# Patient Record
Sex: MALE | Race: WHITE | NOT HISPANIC OR LATINO | Employment: UNEMPLOYED | URBAN - METROPOLITAN AREA
[De-identification: names, ages, dates, MRNs, and addresses within clinical notes are randomized per-mention and may not be internally consistent; named-entity substitution may affect disease eponyms.]

---
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
## 2025-01-16 ENCOUNTER — OFFICE VISIT (OUTPATIENT)
Dept: RHEUMATOLOGY | Facility: CLINIC | Age: 20
End: 2025-01-16
Attending: INTERNAL MEDICINE
Payer: COMMERCIAL

## 2025-01-16 VITALS
WEIGHT: 168.43 LBS | BODY MASS INDEX: 25.53 KG/M2 | OXYGEN SATURATION: 98 % | HEART RATE: 90 BPM | HEIGHT: 68 IN | TEMPERATURE: 98 F | SYSTOLIC BLOOD PRESSURE: 117 MMHG | DIASTOLIC BLOOD PRESSURE: 80 MMHG

## 2025-01-16 DIAGNOSIS — M08.80 JIA (JUVENILE IDIOPATHIC ARTHRITIS), ENTHESITIS RELATED ARTHRITIS (H): ICD-10-CM

## 2025-01-16 DIAGNOSIS — Z11.3 ROUTINE SCREENING FOR STI (SEXUALLY TRANSMITTED INFECTION): ICD-10-CM

## 2025-01-16 DIAGNOSIS — M08.80 JIA (JUVENILE IDIOPATHIC ARTHRITIS), ENTHESITIS RELATED ARTHRITIS (H): Primary | ICD-10-CM

## 2025-01-16 DIAGNOSIS — M46.1 SACROILIITIS: ICD-10-CM

## 2025-01-16 LAB
ALBUMIN SERPL BCG-MCNC: 4.7 G/DL (ref 3.5–5.2)
ALBUMIN UR-MCNC: 10 MG/DL
ALP SERPL-CCNC: 101 U/L (ref 65–260)
ALT SERPL W P-5'-P-CCNC: 36 U/L (ref 0–50)
APPEARANCE UR: CLEAR
AST SERPL W P-5'-P-CCNC: 30 U/L (ref 0–35)
BASOPHILS # BLD AUTO: 0 10E3/UL (ref 0–0.2)
BASOPHILS NFR BLD AUTO: 0 %
BILIRUB DIRECT SERPL-MCNC: <0.2 MG/DL (ref 0–0.3)
BILIRUB SERPL-MCNC: 0.4 MG/DL
BILIRUB UR QL STRIP: NEGATIVE
COLOR UR AUTO: YELLOW
CREAT SERPL-MCNC: 0.92 MG/DL (ref 0.67–1.17)
CRP SERPL-MCNC: <3 MG/L
EGFRCR SERPLBLD CKD-EPI 2021: >90 ML/MIN/1.73M2
EOSINOPHIL # BLD AUTO: 0.4 10E3/UL (ref 0–0.7)
EOSINOPHIL NFR BLD AUTO: 6 %
ERYTHROCYTE [DISTWIDTH] IN BLOOD BY AUTOMATED COUNT: 12 % (ref 10–15)
ERYTHROCYTE [SEDIMENTATION RATE] IN BLOOD BY WESTERGREN METHOD: 19 MM/HR (ref 0–15)
GLUCOSE UR STRIP-MCNC: NEGATIVE MG/DL
HCT VFR BLD AUTO: 45.9 % (ref 40–53)
HGB BLD-MCNC: 16 G/DL (ref 13.3–17.7)
HGB UR QL STRIP: NEGATIVE
HIV 1+2 AB+HIV1 P24 AG SERPL QL IA: NONREACTIVE
IMM GRANULOCYTES # BLD: 0 10E3/UL
IMM GRANULOCYTES NFR BLD: 0 %
KETONES UR STRIP-MCNC: NEGATIVE MG/DL
LEUKOCYTE ESTERASE UR QL STRIP: NEGATIVE
LYMPHOCYTES # BLD AUTO: 2.4 10E3/UL (ref 0.8–5.3)
LYMPHOCYTES NFR BLD AUTO: 32 %
MCH RBC QN AUTO: 31.6 PG (ref 26.5–33)
MCHC RBC AUTO-ENTMCNC: 34.9 G/DL (ref 31.5–36.5)
MCV RBC AUTO: 91 FL (ref 78–100)
MONOCYTES # BLD AUTO: 0.7 10E3/UL (ref 0–1.3)
MONOCYTES NFR BLD AUTO: 9 %
MUCOUS THREADS #/AREA URNS LPF: PRESENT /LPF
NEUTROPHILS # BLD AUTO: 3.9 10E3/UL (ref 1.6–8.3)
NEUTROPHILS NFR BLD AUTO: 52 %
NITRATE UR QL: NEGATIVE
NRBC # BLD AUTO: 0 10E3/UL
NRBC BLD AUTO-RTO: 0 /100
PH UR STRIP: 5.5 [PH] (ref 5–7)
PLATELET # BLD AUTO: 303 10E3/UL (ref 150–450)
PROT SERPL-MCNC: 8.4 G/DL (ref 6.4–8.3)
RBC # BLD AUTO: 5.07 10E6/UL (ref 4.4–5.9)
RBC URINE: 1 /HPF
SP GR UR STRIP: 1.03 (ref 1–1.03)
UROBILINOGEN UR STRIP-MCNC: NORMAL MG/DL
WBC # BLD AUTO: 7.5 10E3/UL (ref 4–11)
WBC URINE: 2 /HPF

## 2025-01-16 PROCEDURE — 86140 C-REACTIVE PROTEIN: CPT | Performed by: INTERNAL MEDICINE

## 2025-01-16 PROCEDURE — 82248 BILIRUBIN DIRECT: CPT | Performed by: INTERNAL MEDICINE

## 2025-01-16 PROCEDURE — 99214 OFFICE O/P EST MOD 30 MIN: CPT | Performed by: INTERNAL MEDICINE

## 2025-01-16 PROCEDURE — 99213 OFFICE O/P EST LOW 20 MIN: CPT | Performed by: INTERNAL MEDICINE

## 2025-01-16 PROCEDURE — 80076 HEPATIC FUNCTION PANEL: CPT | Performed by: INTERNAL MEDICINE

## 2025-01-16 PROCEDURE — 85025 COMPLETE CBC W/AUTO DIFF WBC: CPT | Performed by: INTERNAL MEDICINE

## 2025-01-16 PROCEDURE — 36415 COLL VENOUS BLD VENIPUNCTURE: CPT | Performed by: INTERNAL MEDICINE

## 2025-01-16 PROCEDURE — 81001 URINALYSIS AUTO W/SCOPE: CPT | Performed by: INTERNAL MEDICINE

## 2025-01-16 PROCEDURE — 87491 CHLMYD TRACH DNA AMP PROBE: CPT | Performed by: INTERNAL MEDICINE

## 2025-01-16 PROCEDURE — 85652 RBC SED RATE AUTOMATED: CPT | Performed by: INTERNAL MEDICINE

## 2025-01-16 PROCEDURE — 82565 ASSAY OF CREATININE: CPT | Performed by: INTERNAL MEDICINE

## 2025-01-16 PROCEDURE — 87389 HIV-1 AG W/HIV-1&-2 AB AG IA: CPT | Performed by: INTERNAL MEDICINE

## 2025-01-16 ASSESSMENT — PAIN SCALES - GENERAL: PAINLEVEL_OUTOF10: MILD PAIN (2)

## 2025-01-16 NOTE — PROGRESS NOTES
Rheumatology History:   Date of symptom onset: 12/9/2014  Date of first visit to center: 11/9/2015  Date of NISHI diagnosis: 11/9/2015  ILAR category: enthesitis-related arthritis  LIZBET Status: negative   RF Status: negative   CCP Status: not done   HLA-B27 Status: not done        Ophthalmology History:   Iritis/Uveitis Comorbidity: no   Date of last eye exam: 7/15/2021          Medications:   As of completion of this visit:  Current Outpatient Medications   Medication Sig Dispense Refill    Adalimumab (HUMIRA *CF*) 40 MG/0.4ML pen kit Inject 0.4 mLs (40 mg) subcutaneously every 14 days. 2 each 0    loratadine (CLARITIN) 10 MG tablet Take 10 mg by mouth daily as needed for allergies       sulfaSALAzine (AZULFIDINE) 500 MG tablet Take 3 tablets (1,500 mg) by mouth 2 times daily. PLEASE SCHEDULE APPOINTMENT AND HAVE LABS DONE. 180 tablet 0          Allergies:     Allergies   Allergen Reactions    Seasonal Allergies          Problem list:     Patient Active Problem List    Diagnosis Date Noted    Other iron deficiency anemia 03/19/2021     Priority: Medium     After previous GI bleed.       GI bleed 12/16/2019     Priority: Medium    Gastrointestinal bleed 12/14/2019     Priority: Medium     Due to duodenal ulcers while on naproxen.     Avoid NSAIDs.       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 Continue sulfasalazine  8/2022: Disease flared. Restarted Humira  10/2022: Overall doing well with minimal symptoms and normal exam. Forgetting to take sulfasalazine. Recommended reminders to take it. Continue Humira.   5/2024: Feeling sore in the back and hips. Restart sulfasalazine. Continue adalimumab.             Subjective:     Macros is a 19 year old male who was seen in Pediatric Rheumatology clinic today for follow up. Marcos is unaccompanied today. There were no encounter diagnoses. At the last visit 4 months ago, he was doing well thus we made no changes to therapies. Since that time he has been doing well    His joints are \"Ok\". Some days he has a lot of pain in the back, knees, hips a little. Worse on colder days. Had  Prescribed medications have been administered regularly, without missed doses. Medications have been tolerated well, without side effects.***    Seeing someone [facility] for mental health.     *** Comprehensive Review of Systems is otherwise negative.    Information per our standardized questionnaire is as below:    Self Report    (This is measured 0 = no pain, 10 = very severe pain)    (This is measured 0 = very well, 10 = very poorly)  Patient Highest Level of Education: elementary/middle school     Interim Arthritis History                               Examination:   Blood pressure 117/80, pulse 90, temperature 98  F (36.7  C), temperature source Tympanic, height 1.728 m (5' 8.03\"), weight 76.4 kg (168 lb 6.9 oz), SpO2 98%.  71 %ile (Z= 0.55) based on CDC (Boys, 2-20 Years) weight-for-age data using data from 1/16/2025.  Blood pressure %ricardo are not available for patients who are 18 years or older.  Body surface area is 1.91 meters squared.     Gen: Pleasant, well-appearing, NAD  HEENT/Neck: " TM's clear bilaterally, oropharynx is clear without lesions, neck is supple with no lymphadenopathy                  CV: Regular rate and rhythm, normal S1, S2, no murmurs  Resp: Clear to ascultation bilaterally  Abd: Soft, non-tender, non-distended, no hepatosplenomegaly  Skin: Clear, there is no rash  MSK: All joints were examined including TMJ, sternoclavicular, acromioclavicular, neck, shoulder, elbow, wrist, hips, knees, ankles, fingers, and toes, and all were normal except as follows:   JA Exam Details:  Axial Skeleton     Upper Extremity     Lower Extremity     Entheses       Positive JAY test:     Modified Schober's (yes/no, cm):   ,      Total active joints:      Total limited joints:     Tender entheses count:     SI Tenderness:           Last Lab Results:            Assessment:     Marcos is a 19 year old male with enthesitis-related arthritis with sacroiliitis. Marcos is treated with Humira and sulfasalazine. The disease is under good control. Therefore we will continue current management.     He has been diagnosed with adjustment disorder and is working with a psychiatrist.     Treat to Target:   [id number] score:           Plan:   Laboratory monitoring was done today. ***  Medications: As listed. Changes made today: ***.  Continue eye exam monitoring as outlined above in the problem list.   No follow-ups on file.       *** min spent on the date of the encounter in chart review, patient visit, review of tests, documentation and/or discussion with other providers about the issues documented above.      If there are any new questions or concerns, I would be glad to help and can be reached through our main office at [phone] or our paging  at [phone].      Flora Andrea MD  Pediatric Rheumatology  [facility]      CC  Patient Care Team:  Marcelino Mesa MD as PCP - General (Pediatrics)  Flora Andrea MD as MD (Pediatric  Rheumatology)  Zena Farrell RD as Registered Dietitian (Dietitian, Registered)  Flora Andrea MD as Assigned Pediatric Specialist Provider  Aden[facility] Pike Medical CenterAlba MD as MD (Pediatric Gastroenterology)  Nino Vallejo MD as Resident (Psychiatry)  Joselito Aleman, PhD as Psychologist (Psychiatry)  FLORA ANDREA    Copy to patient  LINA CARLSON MICHAEL  [address] [place] [address]               with his morning sulfasalazine. We discussed if he wanted to make adjustments to his medications, for example increase his Humira, but his sense is that most of his soreness is from the cold weather. He opted to try to take the sulfasalazine on a more routine basis. I recommended he keep his morning pills at his bedside next to his glasses so he would be less like to forget.      He has been diagnosed with adjustment disorder and is working with a psychiatrist.     He requested STI testing and this was negative. I had also ordered a syphilis test but this was not done.     Treat to Target:   [id number] score: 3.5         Plan:   Laboratory monitoring was done today.   STI screening was negative.   Medications: As listed. Changes made today: none.  Continue eye exam monitoring as outlined above in the problem list.   No follow-ups on file. Follow-up 3-4 months.       26 min spent on the date of the encounter in chart review, patient visit, review of tests, documentation and/or discussion with other providers about the issues documented above.      If there are any new questions or concerns, I would be glad to help and can be reached through our main office at [phone] or our paging  at [phone].      Flora Andrea MD  Pediatric Rheumatology  [facility]  Patient Care Team:  Marcelino Mesa MD as PCP - General (Pediatrics)  Flora Andrea MD as MD (Pediatric Rheumatology)  Zena Farrell RD as Registered Dietitian (Dietitian, Registered)  Flora Andrea MD as Assigned Pediatric Specialist Provider  Alba Romero MD as MD (Pediatric Gastroenterology)  Nino Vallejo MD as Resident (Psychiatry)  Joselito Aleman, PhD as Psychologist (Psychiatry)  FLORA ANDREA    Copy to patient  LINA CARLSON MICHAEL  [address] [place] [address]

## 2025-01-16 NOTE — PATIENT INSTRUCTIONS
For Patient Education Materials:  z.[facility]/sharla       [facility] Pediatric Rheumatology    For Help:  The Pediatric Call Center at [phone] can help with scheduling of routine follow up visits.  Bernarda Way and Angela Mendez are the Nurse Coordinators for the Division of Pediatric Rheumatology and can be reached by phone at [phone] or through [employer] [email or web address]). They can help with questions about your child s rheumatic condition, medications, and test results.  For emergencies after hours or on the weekends, please call the page  at [phone] and ask to speak to the physician on-call for Pediatric Rheumatology. Please do not use [employer] for urgent requests.  Main  Services:  [phone]  Hmong/Nepalese/Algerian: [phone]  Bahraini: [phone]  Icelandic: [phone]    Internal Referrals: If we refer your child to another physician/team within [facility], you should receive a call to set this up. If you do not hear anything within a week, please call the Call Center at [phone].    External Referrals: If we refer your child to a physician/team outside of [facility], our team will send the referral order and relevant records to them. We ask that you call the place where your child is being referred to ensure they received the needed information and notify our team coordinators if not.    Imaging: If your child needs an imaging study that is not being performed the day of your clinic appointment, please call to set this up. For xrays, ultrasounds, and echocardiogram call [phone]. For CT or MRI call [phone].     MyChart: We encourage you to sign up for [employer] at [email or web address]. For assistance or questions, call [phone]. If your child is 12 years or older, a consent for proxy/parent access needs to be signed so please discuss this with your physician at the next visit.

## 2025-01-16 NOTE — NURSING NOTE
Chief Complaint   Patient presents with    RECHECK       There were no vitals filed for this visit.    Drug: LMX 4 (Lidocaine 4%) Topical Anesthetic Cream  Patient weight: Patient weight not available.  Weight-based dose: Patient weight > 10 k.5 grams (1/2 of 5 gram tube)  Site: left antecubital and right antecubital  Previous allergies: No    Patient MyChart Active? Yes      Does patient need PHQ-2 completed today? Yes    Gunnar Isaacs  2025

## 2025-01-16 NOTE — LETTER
1/16/2025      RE: Marcos Nicole  [address]  [place] [address]     Dear Colleague,    Thank you for the opportunity to participate in the care of your patient, Marcos Nicole, at the [facility] at [facility]. Please see a copy of my visit note below.        Rheumatology History:   Date of symptom onset: 12/9/2014  Date of first visit to center: 11/9/2015  Date of NISHI diagnosis: 11/9/2015  ILAR category: enthesitis-related arthritis  LIZBET Status: negative   RF Status: negative   CCP Status: not done   HLA-B27 Status: not done        Ophthalmology History:   Iritis/Uveitis Comorbidity: no   Date of last eye exam: 7/15/2021          Medications:   As of completion of this visit:  Current Outpatient Medications   Medication Sig Dispense Refill     Adalimumab (HUMIRA *CF*) 40 MG/0.4ML pen kit Inject 0.4 mLs (40 mg) subcutaneously every 14 days. 2 each 5     loratadine (CLARITIN) 10 MG tablet Take 10 mg by mouth daily as needed for allergies        sulfaSALAzine (AZULFIDINE) 500 MG tablet Take 3 tablets (1,500 mg) by mouth 2 times daily. PLEASE SCHEDULE APPOINTMENT AND HAVE LABS DONE. 180 tablet 0          Allergies:     Allergies   Allergen Reactions     Seasonal Allergies          Problem list:     Patient Active Problem List    Diagnosis Date Noted     Other iron deficiency anemia 03/19/2021     Priority: Medium     After previous GI bleed.        GI bleed 12/16/2019     Priority: Medium     Gastrointestinal bleed 12/14/2019     Priority: Medium     Due to duodenal ulcers while on naproxen.     Avoid NSAIDs.        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 Continue sulfasalazine  8/2022: Disease flared. Restarted Humira  10/2022: Overall doing well with minimal symptoms and normal exam. Forgetting to take sulfasalazine. Recommended reminders to take it. Continue Humira.   5/2024: Feeling sore in the back and hips. Restart sulfasalazine. Continue adalimumab.             Subjective:     Marcos is a 19 year old male who was seen in Pediatric Rheumatology clinic today for follow up. Marcos is unaccompanied today. The primary encounter diagnosis was NISHI (juvenile idiopathic arthritis), enthesitis related arthritis (H). A diagnosis of Routine screening for STI (sexually transmitted infection) was also pertinent to this visit. At the last visit 4 months ago, he was doing well thus we made no changes to therapies. Since that time he has been doing ok in terms of his arthritis.    His joints are \"Ok\". Some days he has a lot of pain in the back, knees, hips a little. Worse on colder days. Has been forgetting his morning sulfasalazine most days. Takes the evening dose with dinner. Does not miss the Humira.     Is currently attending the local [employer], took a leave of absence from the [employer].     I saw that he had a few psychiatry visits and asked him about his mental health. He said he's doing ok and is seeing someone [facility] for mental health. He said there are a lot of stressors " "going on at home right now.     Comprehensive Review of Systems is otherwise negative.    Information per our standardized questionnaire is as below:    Self Report  Patient Pain Status: 3 (This is measured 0 = no pain, 10 = very severe pain)  Patient Global Assessment of Disease Activity: 3 (This is measured 0 = very well, 10 = very poorly)  Patient Highest Level of Education: elementary/middle school     Interim Arthritis History  Morning Stiffness in the past week: >15-30 minutes  Recent Back Pain: Yes    Since your last visit has your arthritis stopped you from trying any athletic or rigorous activities or interfaced with your ability to do these activities? No  Have you been limited your ability to do normal daily activities in the past week? No  Did you need help from other people to do normal activities in the past week? No  Have you used any aids or devices to help you do normal daily activities in the past week? No           Examination:   Blood pressure 117/80, pulse 90, temperature 98  F (36.7  C), temperature source Tympanic, height 1.728 m (5' 8.03\"), weight 76.4 kg (168 lb 6.9 oz), SpO2 98%.  71 %ile (Z= 0.55) based on CDC (Boys, 2-20 Years) weight-for-age data using data from 1/16/2025.  Blood pressure %ricardo are not available for patients who are 18 years or older.  Body surface area is 1.91 meters squared.     Gen: Pleasant, well-appearing, NAD  HEENT/Neck: TM's clear bilaterally, oropharynx is clear without lesions, neck is supple with no lymphadenopathy                  CV: Regular rate and rhythm, normal S1, S2, no murmurs  Resp: Clear to ascultation bilaterally  Abd: Soft, non-tender, non-distended, no hepatosplenomegaly  Skin: Clear, there is no rash  MSK: All joints were examined including TMJ, sternoclavicular, acromioclavicular, neck, shoulder, elbow, wrist, hips, knees, ankles, fingers, and toes, and all were normal except as follows:   JA Exam Details:      Total active joints:  0   Total " limited joints:  0  Tender entheses count:  0  SI Tenderness: No         Last Lab Results:     Office Visit on 01/16/2025   Component Date Value     Protein Total 01/16/2025 8.4 (H)      Albumin 01/16/2025 4.7      Bilirubin Total 01/16/2025 0.4      Alkaline Phosphatase 01/16/2025 101      AST 01/16/2025 30      ALT 01/16/2025 36      Bilirubin Direct 01/16/2025 <0.20      Creatinine 01/16/2025 0.92      GFR Estimate 01/16/2025 >90      CRP Inflammation 01/16/2025 <3.00      Erythrocyte Sedimentatio* 01/16/2025 19 (H)      HIV Antigen Antibody Com* 01/16/2025 Nonreactive      WBC Count 01/16/2025 7.5      RBC Count 01/16/2025 5.07      Hemoglobin 01/16/2025 16.0      Hematocrit 01/16/2025 45.9      MCV 01/16/2025 91      MCH 01/16/2025 31.6      MCHC 01/16/2025 34.9      RDW 01/16/2025 12.0      Platelet Count 01/16/2025 303      % Neutrophils 01/16/2025 52      % Lymphocytes 01/16/2025 32      % Monocytes 01/16/2025 9      % Eosinophils 01/16/2025 6      % Basophils 01/16/2025 0      % Immature Granulocytes 01/16/2025 0      NRBCs per 100 WBC 01/16/2025 0      Absolute Neutrophils 01/16/2025 3.9      Absolute Lymphocytes 01/16/2025 2.4      Absolute Monocytes 01/16/2025 0.7      Absolute Eosinophils 01/16/2025 0.4      Absolute Basophils 01/16/2025 0.0      Absolute Immature Granul* 01/16/2025 0.0      Absolute NRBCs 01/16/2025 0.0      Chlamydia Trachomatis 01/16/2025 Negative      Neisseria gonorrhoeae 01/16/2025 Negative      CTNG Specimen Source 01/16/2025 Urine, Voided      Color Urine 01/16/2025 Yellow      Appearance Urine 01/16/2025 Clear      Glucose Urine 01/16/2025 Negative      Bilirubin Urine 01/16/2025 Negative      Ketones Urine 01/16/2025 Negative      Specific Gravity Urine 01/16/2025 1.034      Blood Urine 01/16/2025 Negative      pH Urine 01/16/2025 5.5      Protein Albumin Urine 01/16/2025 10 (A)      Urobilinogen Urine 01/16/2025 Normal      Nitrite Urine 01/16/2025 Negative      Leukocyte  Esterase Urine 01/16/2025 Negative      Mucus Urine 01/16/2025 Present (A)      RBC Urine 01/16/2025 1      WBC Urine 01/16/2025 2             Assessment:     Marcos is a 19 year old male with enthesitis-related arthritis with sacroiliitis. Marcos is treated with Humira and sulfasalazine. The disease is under fairly good control, but he's having some days of more stiffness and soreness in the back and hips and knees. He's not consistent with his morning sulfasalazine. We discussed if he wanted to make adjustments to his medications, for example increase his Humira, but his sense is that most of his soreness is from the cold weather. He opted to try to take the sulfasalazine on a more routine basis. I recommended he keep his morning pills at his bedside next to his glasses so he would be less like to forget.      He has been diagnosed with adjustment disorder and is working with a psychiatrist.     He requested STI testing and this was negative. I had also ordered a syphilis test but this was not done.     Treat to [employer]:   [id number] score: 3.5         Plan:   Laboratory monitoring was done today.   STI screening was negative.   Medications: As listed. Changes made today: none.  Continue eye exam monitoring as outlined above in the problem list.   No follow-ups on file. Follow-up 3-4 months.       26 min spent on the date of the encounter in chart review, patient visit, review of tests, documentation and/or discussion with other providers about the issues documented above.      If there are any new questions or concerns, I would be glad to help and can be reached through our main office at [phone] or our paging  at [phone].      Flora Andrea MD  Pediatric Rheumatology  [facility]      CC  Patient Care Team:  Marcelino Mesa MD as PCP - General (Pediatrics)  Flora Andrea MD as MD (Pediatric Rheumatology)  Zena Farrell RD as  Registered Dietitian (Dietitian, Registered)  Flora Andrea MD as Assigned Pediatric Specialist Provider  Alba Romero MD as MD (Pediatric Gastroenterology)  Nino Vallejo MD as Resident (Psychiatry)  Joselito Aleman, PhD as Psychologist (Psychiatry)  FLORA ANDREA    Copy to patient  LINA CARLSON MICHAEL  [address] [place] [address]                Please do not hesitate to contact me if you have any questions/concerns.     Sincerely,       Flora Andrea MD

## 2025-01-17 LAB
C TRACH DNA SPEC QL PROBE+SIG AMP: NEGATIVE
N GONORRHOEA DNA SPEC QL NAA+PROBE: NEGATIVE
SPECIMEN TYPE: NORMAL

## 2025-01-18 DIAGNOSIS — M08.80 JIA (JUVENILE IDIOPATHIC ARTHRITIS), ENTHESITIS RELATED ARTHRITIS (H): Primary | ICD-10-CM

## 2025-03-18 ENCOUNTER — MYC REFILL (OUTPATIENT)
Dept: RHEUMATOLOGY | Facility: CLINIC | Age: 20
End: 2025-03-18
Payer: COMMERCIAL

## 2025-03-18 DIAGNOSIS — M08.80 JIA (JUVENILE IDIOPATHIC ARTHRITIS), ENTHESITIS RELATED ARTHRITIS (H): ICD-10-CM

## 2025-03-18 RX ORDER — SULFASALAZINE 500 MG/1
1500 TABLET ORAL 2 TIMES DAILY
Qty: 180 TABLET | Refills: 0 | Status: SHIPPED | OUTPATIENT
Start: 2025-03-18

## 2025-04-10 ENCOUNTER — OFFICE VISIT (OUTPATIENT)
Dept: RHEUMATOLOGY | Facility: CLINIC | Age: 20
End: 2025-04-10
Attending: INTERNAL MEDICINE
Payer: COMMERCIAL

## 2025-04-10 VITALS
HEIGHT: 68 IN | DIASTOLIC BLOOD PRESSURE: 76 MMHG | BODY MASS INDEX: 26.43 KG/M2 | WEIGHT: 174.38 LBS | OXYGEN SATURATION: 99 % | SYSTOLIC BLOOD PRESSURE: 126 MMHG | RESPIRATION RATE: 16 BRPM | HEART RATE: 76 BPM | TEMPERATURE: 98.1 F

## 2025-04-10 DIAGNOSIS — M08.80 JIA (JUVENILE IDIOPATHIC ARTHRITIS), ENTHESITIS RELATED ARTHRITIS (H): ICD-10-CM

## 2025-04-10 DIAGNOSIS — M45.6 ANKYLOSING SPONDYLITIS OF LUMBAR REGION (H): Primary | ICD-10-CM

## 2025-04-10 LAB
ALBUMIN SERPL BCG-MCNC: 4.5 G/DL (ref 3.5–5.2)
ALP SERPL-CCNC: 80 U/L (ref 65–260)
ALT SERPL W P-5'-P-CCNC: 41 U/L (ref 0–50)
AST SERPL W P-5'-P-CCNC: 28 U/L (ref 0–35)
BASOPHILS # BLD AUTO: 0 10E3/UL (ref 0–0.2)
BASOPHILS NFR BLD AUTO: 0 %
BILIRUB DIRECT SERPL-MCNC: 0.11 MG/DL (ref 0–0.3)
BILIRUB SERPL-MCNC: 0.4 MG/DL
CREAT SERPL-MCNC: 0.86 MG/DL (ref 0.67–1.17)
CRP SERPL-MCNC: 3.45 MG/L
EGFRCR SERPLBLD CKD-EPI 2021: >90 ML/MIN/1.73M2
EOSINOPHIL # BLD AUTO: 0.3 10E3/UL (ref 0–0.7)
EOSINOPHIL NFR BLD AUTO: 4 %
ERYTHROCYTE [DISTWIDTH] IN BLOOD BY AUTOMATED COUNT: 12.4 % (ref 10–15)
ERYTHROCYTE [SEDIMENTATION RATE] IN BLOOD BY WESTERGREN METHOD: 16 MM/HR (ref 0–15)
HCT VFR BLD AUTO: 41.8 % (ref 40–53)
HGB BLD-MCNC: 14.3 G/DL (ref 13.3–17.7)
IMM GRANULOCYTES # BLD: 0 10E3/UL
IMM GRANULOCYTES NFR BLD: 0 %
LYMPHOCYTES # BLD AUTO: 2.8 10E3/UL (ref 0.8–5.3)
LYMPHOCYTES NFR BLD AUTO: 35 %
MCH RBC QN AUTO: 32.1 PG (ref 26.5–33)
MCHC RBC AUTO-ENTMCNC: 34.2 G/DL (ref 31.5–36.5)
MCV RBC AUTO: 94 FL (ref 78–100)
MONOCYTES # BLD AUTO: 1 10E3/UL (ref 0–1.3)
MONOCYTES NFR BLD AUTO: 13 %
NEUTROPHILS # BLD AUTO: 3.7 10E3/UL (ref 1.6–8.3)
NEUTROPHILS NFR BLD AUTO: 47 %
NRBC # BLD AUTO: 0 10E3/UL
NRBC BLD AUTO-RTO: 0 /100
PLATELET # BLD AUTO: 269 10E3/UL (ref 150–450)
PROT SERPL-MCNC: 7.9 G/DL (ref 6.4–8.3)
RBC # BLD AUTO: 4.45 10E6/UL (ref 4.4–5.9)
WBC # BLD AUTO: 7.8 10E3/UL (ref 4–11)

## 2025-04-10 PROCEDURE — 85004 AUTOMATED DIFF WBC COUNT: CPT | Performed by: INTERNAL MEDICINE

## 2025-04-10 PROCEDURE — 82247 BILIRUBIN TOTAL: CPT | Performed by: INTERNAL MEDICINE

## 2025-04-10 PROCEDURE — 36415 COLL VENOUS BLD VENIPUNCTURE: CPT | Performed by: INTERNAL MEDICINE

## 2025-04-10 PROCEDURE — 82565 ASSAY OF CREATININE: CPT | Performed by: INTERNAL MEDICINE

## 2025-04-10 PROCEDURE — 86140 C-REACTIVE PROTEIN: CPT | Performed by: INTERNAL MEDICINE

## 2025-04-10 PROCEDURE — 82040 ASSAY OF SERUM ALBUMIN: CPT | Performed by: INTERNAL MEDICINE

## 2025-04-10 PROCEDURE — 82248 BILIRUBIN DIRECT: CPT | Performed by: INTERNAL MEDICINE

## 2025-04-10 PROCEDURE — 85652 RBC SED RATE AUTOMATED: CPT | Performed by: INTERNAL MEDICINE

## 2025-04-10 PROCEDURE — 99213 OFFICE O/P EST LOW 20 MIN: CPT | Performed by: INTERNAL MEDICINE

## 2025-04-10 ASSESSMENT — PAIN SCALES - GENERAL: PAINLEVEL_OUTOF10: MILD PAIN (3)

## 2025-04-10 NOTE — NURSING NOTE
Peds Outpatient BP  1) Rested for 5 minutes, BP taken on bare arm, patient sitting (or supine for infants) w/ legs uncrossed?   Yes  2) Right arm used?  Right arm   Yes  3) Arm circumference of largest part of upper arm (in cm): 30  4) BP cuff sized used: Adult (25-32cm)   If used different size cuff then what was recommended why? N/A  5) First BP reading:machine   BP Readings from Last 1 Encounters:   04/10/25 126/76      Is reading >90%?No   (90% for <1 years is 90/50)  (90% for >18 years is 140/90)  *If a machine BP is at or above 90% take manual BP  6) Manual BP reading: N/A  7) Other comments: None    Lizz Diggs CMA.

## 2025-04-10 NOTE — NURSING NOTE
"Chief Complaint   Patient presents with    Arthritis     Juvenile Idiopathic Arthritis (NISHI).       Vitals:    04/10/25 1050   BP: 126/76   BP Location: Right arm   Patient Position: Chair   Pulse: 76   Resp: 16   Temp: 98.1  F (36.7  C)   TempSrc: Skin   SpO2: 99%   Weight: 174 lb 6.1 oz (79.1 kg)   Height: 5' 8.19\" (173.2 cm)           Lizz Diggs M.A.    April 10, 2025  "

## 2025-04-10 NOTE — LETTER
4/10/2025      RE: Marcos Nicole  [address]  [place] [address]     Dear Colleague,    Thank you for the opportunity to participate in the care of your patient, Marcos Nicole, at the [facility] at [facility]. Please see a copy of my visit note below.        Rheumatology History:   Date of symptom onset: 12/9/2014  Date of first visit to center: 11/9/2015  Date of INSHI diagnosis: 11/9/2015  ILAR category: enthesitis-related arthritis  LIZBET Status: negative   RF Status: negative   CCP Status: not done   HLA-B27 Status: not done         Medications:   As of completion of this visit:  Current Outpatient Medications   Medication Sig Dispense Refill     loratadine (CLARITIN) 10 MG tablet Take 10 mg by mouth daily as needed for allergies        sulfaSALAzine (AZULFIDINE) 500 MG tablet Take 3 tablets (1,500 mg) by mouth 2 times daily. 180 tablet 0          Allergies:     Allergies   Allergen Reactions     Seasonal Allergies          Problem list:     Patient Active Problem List    Diagnosis Date Noted     Other iron deficiency anemia 03/19/2021     Priority: Medium     After previous GI bleed.        GI bleed 12/16/2019     Priority: Medium     Gastrointestinal bleed 12/14/2019     Priority: Medium     Due to duodenal ulcers while on naproxen.     Avoid NSAIDs.        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 Continue sulfasalazine  8/2022: Disease flared. Restarted Humira  10/2022: Overall doing well with minimal symptoms and normal exam. Forgetting to take sulfasalazine. Recommended reminders to take it. Continue Humira.   5/2024: Feeling sore in the back and hips. Restart sulfasalazine. Continue adalimumab.               Subjective:     Marcos is a 19 year old male who was seen in Pediatric Rheumatology clinic today for follow up. Marcos is unaccompanied today.  The primary encounter diagnosis was Ankylosing spondylitis of lumbar region (H). A diagnosis of NISHI (juvenile idiopathic arthritis), enthesitis related arthritis (H) was also pertinent to this visit. At the last visit 3 months ago, he was overall doing well thus we made no changes to therapies. . Since that time he has been doing well      - Feeling sore and stiff, especially in the low back, worse in the morning, doesn't think the Humira is working anymore.  - Taking Humira every other week and full dose of sulfasalazine  - Noticed symptoms worsening before the next Humira dose  - No significant swelling observed  - Previously on weekly Humira  - Has been doing well on current treatment until recently  - Considering MRI to confirm arthritis if needed     Prescribed medications have been administered regularly, without missed doses.  Medications have been tolerated well, without side effects.     Comprehensive Review of Systems is otherwise negative.    Information per our standardized questionnaire is as below:    Self Report  Patient Pain Status: 3 (This is measured 0 = no pain, 10 = very severe pain)  Patient Global Assessment of Disease Activity: 2.5  "(This is measured 0 = very well, 10 = very poorly)  Patient Highest Level of Education: elementary/middle school     Interim Arthritis History  Morning Stiffness in the past week: >15-30 minutes  Recent Back Pain: No    Since your last visit has your arthritis stopped you from trying any athletic or rigorous activities or interfaced with your ability to do these activities? No  Have you been limited your ability to do normal daily activities in the past week? No  Did you need help from other people to do normal activities in the past week? No  Have you used any aids or devices to help you do normal daily activities in the past week? No           Examination:   Blood pressure 126/76, pulse 76, temperature 98.1  F (36.7  C), temperature source Skin, resp. rate 16, height 1.732 m (5' 8.19\"), weight 79.1 kg (174 lb 6.1 oz), SpO2 99%.  76 %ile (Z= 0.72) based on [facility] (Boys, 2-20 Years) weight-for-age data using data from 4/10/2025.  Blood pressure %ricardo are not available for patients who are 18 years or older.  Body surface area is 1.95 meters squared.     Gen: Pleasant, well-appearing, NAD  HEENT/Neck: TM's clear bilaterally, oropharynx is clear without lesions, neck is supple with no lymphadenopathy                  CV: Regular rate and rhythm, normal S1, S2, no murmurs  Resp: Clear to ascultation bilaterally  Abd: Soft, non-tender, non-distended, no hepatosplenomegaly  Skin: Clear, there is no rash  MSK: All joints were examined including TMJ, sternoclavicular, acromioclavicular, neck, shoulder, elbow, wrist, hips, knees, ankles, fingers, and toes, and all were normal except as follows:   JA Exam Details:    Positive JAY test:  Yes reports right Si joint tenderness with JAY. No tenderness to direct palpation of the SI joints.  Modified Schober's (yes/no, cm):   ,      Total active joints:  1   Total limited joints:  0  Tender entheses count:  0  SI Tenderness: Yes         Last Lab Results:     Office Visit on " 04/10/2025   Component Date Value     Protein Total 04/10/2025 7.9      Albumin 04/10/2025 4.5      Bilirubin Total 04/10/2025 0.4      Alkaline Phosphatase 04/10/2025 80      AST 04/10/2025 28      ALT 04/10/2025 41      Bilirubin Direct 04/10/2025 0.11      Creatinine 04/10/2025 0.86      GFR Estimate 04/10/2025 >90      CRP Inflammation 04/10/2025 3.45      Erythrocyte Sedimentatio* 04/10/2025 16 (H)      WBC Count 04/10/2025 7.8      RBC Count 04/10/2025 4.45      Hemoglobin 04/10/2025 14.3      Hematocrit 04/10/2025 41.8      MCV 04/10/2025 94      MCH 04/10/2025 32.1      MCHC 04/10/2025 34.2      RDW 04/10/2025 12.4      Platelet Count 04/10/2025 269      % Neutrophils 04/10/2025 47      % Lymphocytes 04/10/2025 35      % Monocytes 04/10/2025 13      % Eosinophils 04/10/2025 4      % Basophils 04/10/2025 0      % Immature Granulocytes 04/10/2025 0      NRBCs per 100 WBC 04/10/2025 0      Absolute Neutrophils 04/10/2025 3.7      Absolute Lymphocytes 04/10/2025 2.8      Absolute Monocytes 04/10/2025 1.0      Absolute Eosinophils 04/10/2025 0.3      Absolute Basophils 04/10/2025 0.0      Absolute Immature Granul* 04/10/2025 0.0      Absolute NRBCs 04/10/2025 0.0               Assessment:     Marcos is a 19 year old male with ankylosing spondylitis. Marcos is treated with Humira and sulfasalazine. The disease is not under adequate control. Therefore we will switch from Humira to tofacitinib. We discussed that if this switch does not help his pain or if it worsens, we will obtain an MRI of the lumbar spine/pelvis to make sure we are not missing anything. It is a little unusual that he was doing so well on Humira and that it is no longer helping, but he may have developed anti-adalimumab antibodies. I did not check these because we decided to change medications anyway. We discussed several different biologics and he preferred to avoid shots so we opted for tofacitinib .     Assessment & Plan  NISHI (juvenile idiopathic  arthritis), enthesitis related arthritis (H)  - Arthritis appears to be flaring. Consideration of switching from Humira to another biologic medication due to lack of efficacy. Xeljanz, a ALY inhibitor, is proposed as a new treatment option.  - Prescribe Xeljanz with once-a-day extended release dosing. Continue sulfasalazine. Conduct blood work today. Plan to follow up in 3-4 months. If no communication about the new medication within a week, patient should contact the office. Monitor for side effects or worsening symptoms after starting the new medication.  - Risks and side effects: Discussed that Xeljanz is equally immunosuppressive to Humira. No specific side effects mentioned.     Treat to Target:   [id number] score: 5.5         Plan:   Laboratory monitoring was done today. It was reassuring.  Medications: As listed. Changes made today: none.  Continue eye exam monitoring as outlined above in the problem list.   I recommend follow-up in 3 months. Contact us sooner with any concerns.       37 min spent on the date of the encounter in chart review, patient visit, review of tests, documentation and/or discussion with other providers about the issues documented above.      The longitudinal plan of care for   1. Ankylosing spondylitis of lumbar region (H)    2. NISHI (juvenile idiopathic arthritis), enthesitis related arthritis (H)     was addressed during this visit. Due to the added complexity in care, I will continue to support Marcos in the subsequent management of this condition(s) and with the ongoing continuity of care of this condition(s).    If there are any new questions or concerns, I would be glad to help and can be reached through our main office at [phone] or our paging  at [phone].      Flora Andrea MD  Pediatric Rheumatology  [facility]      CC  Patient Care Team:  Marcelino Mesa MD as PCP - General (Pediatrics)  Flora Andrea  MD Lillie as MD (Pediatric Rheumatology)  Zena Farrell RD as Registered Dietitian (Dietitian, Registered)  Flora Andrea MD as Assigned Pediatric Specialist Provider  Alba Romero MD as MD (Pediatric Gastroenterology)  Nino Vallejo MD as Resident (Psychiatry)  Joselito Aleman, PhD as Psychologist (Psychiatry)  FLORA ANDREA    Copy to patient  LINA CARLSONSARARACIELBLAKE  [address] [place] [address]                Please do not hesitate to contact me if you have any questions/concerns.     Sincerely,       Flora Andrea MD

## 2025-04-10 NOTE — PROGRESS NOTES
Rheumatology History:   Date of symptom onset: 12/9/2014  Date of first visit to center: 11/9/2015  Date of NISHI diagnosis: 11/9/2015  ILAR category: enthesitis-related arthritis  LIZBET Status: negative   RF Status: negative   CCP Status: not done   HLA-B27 Status: not done         Medications:   As of completion of this visit:  Current Outpatient Medications   Medication Sig Dispense Refill    loratadine (CLARITIN) 10 MG tablet Take 10 mg by mouth daily as needed for allergies       sulfaSALAzine (AZULFIDINE) 500 MG tablet Take 3 tablets (1,500 mg) by mouth 2 times daily. 180 tablet 0          Allergies:     Allergies   Allergen Reactions    Seasonal Allergies          Problem list:     Patient Active Problem List    Diagnosis Date Noted    Other iron deficiency anemia 03/19/2021     Priority: Medium     After previous GI bleed.       GI bleed 12/16/2019     Priority: Medium    Gastrointestinal bleed 12/14/2019     Priority: Medium     Due to duodenal ulcers while on naproxen.     Avoid NSAIDs.       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 Continue sulfasalazine  8/2022: Disease flared. Restarted  Humira  10/2022: Overall doing well with minimal symptoms and normal exam. Forgetting to take sulfasalazine. Recommended reminders to take it. Continue Humira.   5/2024: Feeling sore in the back and hips. Restart sulfasalazine. Continue adalimumab.               Subjective:     Marcos is a 19 year old male who was seen in Pediatric Rheumatology clinic today for follow up. Marcos is unaccompanied today.  The primary encounter diagnosis was Ankylosing spondylitis of lumbar region (H). A diagnosis of NISHI (juvenile idiopathic arthritis), enthesitis related arthritis (H) was also pertinent to this visit. At the last visit 3 months ago, he was overall doing well thus we made no changes to therapies. . Since that time he has been doing well      - Feeling sore and stiff, especially in the low back, worse in the morning, doesn't think the Humira is working anymore.  - Taking Humira every other week and full dose of sulfasalazine  - Noticed symptoms worsening before the next Humira dose  - No significant swelling observed  - Previously on weekly Humira  - Has been doing well on current treatment until recently  - Considering MRI to confirm arthritis if needed     Prescribed medications have been administered regularly, without missed doses.  Medications have been tolerated well, without side effects.     Comprehensive Review of Systems is otherwise negative.    Information per our standardized questionnaire is as below:    Self Report  Patient Pain Status: 3 (This is measured 0 = no pain, 10 = very severe pain)  Patient Global Assessment of Disease Activity: 2.5 (This is measured 0 = very well, 10 = very poorly)  Patient Highest Level of Education: elementary/middle school     Interim Arthritis History  Morning Stiffness in the past week: >15-30 minutes  Recent Back Pain: No    Since your last visit has your arthritis stopped you from trying any athletic or rigorous activities or interfaced with your ability to do these  "activities? No  Have you been limited your ability to do normal daily activities in the past week? No  Did you need help from other people to do normal activities in the past week? No  Have you used any aids or devices to help you do normal daily activities in the past week? No           Examination:   Blood pressure 126/76, pulse 76, temperature 98.1  F (36.7  C), temperature source Skin, resp. rate 16, height 1.732 m (5' 8.19\"), weight 79.1 kg (174 lb 6.1 oz), SpO2 99%.  76 %ile (Z= 0.72) based on [facility] (Boys, 2-20 Years) weight-for-age data using data from 4/10/2025.  Blood pressure %ricardo are not available for patients who are 18 years or older.  Body surface area is 1.95 meters squared.     Gen: Pleasant, well-appearing, NAD  HEENT/Neck: TM's clear bilaterally, oropharynx is clear without lesions, neck is supple with no lymphadenopathy                  CV: Regular rate and rhythm, normal S1, S2, no murmurs  Resp: Clear to ascultation bilaterally  Abd: Soft, non-tender, non-distended, no hepatosplenomegaly  Skin: Clear, there is no rash  MSK: All joints were examined including TMJ, sternoclavicular, acromioclavicular, neck, shoulder, elbow, wrist, hips, knees, ankles, fingers, and toes, and all were normal except as follows:   JA Exam Details:    Positive JAY test:  Yes reports right Si joint tenderness with JAY. No tenderness to direct palpation of the SI joints.  Modified Schober's (yes/no, cm):   ,      Total active joints:  1   Total limited joints:  0  Tender entheses count:  0  SI Tenderness: Yes         Last Lab Results:     Office Visit on 04/10/2025   Component Date Value    Protein Total 04/10/2025 7.9     Albumin 04/10/2025 4.5     Bilirubin Total 04/10/2025 0.4     Alkaline Phosphatase 04/10/2025 80     AST 04/10/2025 28     ALT 04/10/2025 41     Bilirubin Direct 04/10/2025 0.11     Creatinine 04/10/2025 0.86     GFR Estimate 04/10/2025 >90     CRP Inflammation 04/10/2025 3.45     Erythrocyte " Sedimentatio* 04/10/2025 16 (H)     WBC Count 04/10/2025 7.8     RBC Count 04/10/2025 4.45     Hemoglobin 04/10/2025 14.3     Hematocrit 04/10/2025 41.8     MCV 04/10/2025 94     MCH 04/10/2025 32.1     MCHC 04/10/2025 34.2     RDW 04/10/2025 12.4     Platelet Count 04/10/2025 269     % Neutrophils 04/10/2025 47     % Lymphocytes 04/10/2025 35     % Monocytes 04/10/2025 13     % Eosinophils 04/10/2025 4     % Basophils 04/10/2025 0     % Immature Granulocytes 04/10/2025 0     NRBCs per 100 WBC 04/10/2025 0     Absolute Neutrophils 04/10/2025 3.7     Absolute Lymphocytes 04/10/2025 2.8     Absolute Monocytes 04/10/2025 1.0     Absolute Eosinophils 04/10/2025 0.3     Absolute Basophils 04/10/2025 0.0     Absolute Immature Granul* 04/10/2025 0.0     Absolute NRBCs 04/10/2025 0.0               Assessment:     Marcos is a 19 year old male with ankylosing spondylitis. Marcos is treated with Humira and sulfasalazine. The disease is not under adequate control. Therefore we will switch from Humira to tofacitinib. We discussed that if this switch does not help his pain or if it worsens, we will obtain an MRI of the lumbar spine/pelvis to make sure we are not missing anything. It is a little unusual that he was doing so well on Humira and that it is no longer helping, but he may have developed anti-adalimumab antibodies. I did not check these because we decided to change medications anyway. We discussed several different biologics and he preferred to avoid shots so we opted for tofacitinib .     Assessment & Plan  NISHI (juvenile idiopathic arthritis), enthesitis related arthritis (H)  - Arthritis appears to be flaring. Consideration of switching from Humira to another biologic medication due to lack of efficacy. Xeljanz, a ALY inhibitor, is proposed as a new treatment option.  - Prescribe Xeljanz with once-a-day extended release dosing. Continue sulfasalazine. Conduct blood work today. Plan to follow up in 3-4 months. If no  communication about the new medication within a week, patient should contact the office. Monitor for side effects or worsening symptoms after starting the new medication.  - Risks and side effects: Discussed that Xeljanz is equally immunosuppressive to Humira. No specific side effects mentioned.     Treat to Target:   [id number] score: 5.5         Plan:   Laboratory monitoring was done today. It was reassuring.  Medications: As listed. Changes made today: none.  Continue eye exam monitoring as outlined above in the problem list.   I recommend follow-up in 3 months. Contact us sooner with any concerns.       37 min spent on the date of the encounter in chart review, patient visit, review of tests, documentation and/or discussion with other providers about the issues documented above.      The longitudinal plan of care for   1. Ankylosing spondylitis of lumbar region (H)    2. NISHI (juvenile idiopathic arthritis), enthesitis related arthritis (H)     was addressed during this visit. Due to the added complexity in care, I will continue to support Marcos in the subsequent management of this condition(s) and with the ongoing continuity of care of this condition(s).    If there are any new questions or concerns, I would be glad to help and can be reached through our main office at [phone] or our paging  at [phone].      Flora Andrea MD  Pediatric Rheumatology  [facility]      CC  Patient Care Team:  Marcelino Mesa MD as PCP - General (Pediatrics)  Flora Andrea MD as MD (Pediatric Rheumatology)  Zena Farrell RD as Registered Dietitian (Dietitian, Registered)  Flora Andrea MD as Assigned Pediatric Specialist Provider  Alba Romero MD as MD (Pediatric Gastroenterology)  Nino Vallejo MD as Resident (Psychiatry)  Joselito Aleman, PhD as Psychologist (Psychiatry)  FLORA ANDREA    Copy to  patient  ROBYN, BLAKE CALLE  [address] [place] [address]

## 2025-04-10 NOTE — PATIENT INSTRUCTIONS
For Patient Education Materials:  z.[facility]/sharla       [facility] Pediatric Rheumatology    For Help:  The Pediatric Call Center at [phone] can help with scheduling of routine follow up visits.  Bernarda Way and Angela Mendez are the Nurse Coordinators for the Division of Pediatric Rheumatology and can be reached by phone at [phone] or through [employer] [email or web address]). They can help with questions about your child s rheumatic condition, medications, and test results.  For emergencies after hours or on the weekends, please call the page  at [phone] and ask to speak to the physician on-call for Pediatric Rheumatology. Please do not use [employer] for urgent requests.  Main  Services:  [phone]  Hmong/Maldivian/Angolan: [phone]  Ugandan: [phone]  Turkmen: [phone]    Internal Referrals: If we refer your child to another physician/team within [facility], you should receive a call to set this up. If you do not hear anything within a week, please call the Call Center at [phone].    External Referrals: If we refer your child to a physician/team outside of [facility], our team will send the referral order and relevant records to them. We ask that you call the place where your child is being referred to ensure they received the needed information and notify our team coordinators if not.    Imaging: If your child needs an imaging study that is not being performed the day of your clinic appointment, please call to set this up. For xrays, ultrasounds, and echocardiogram call [phone]. For CT or MRI call [phone].     MyChart: We encourage you to sign up for [employer] at "[email or web address]. For assistance or questions, call [phone]. If your child is 12 years or older, a consent for proxy/parent access needs to be signed so please discuss this with your physician at the next visit.

## 2025-04-15 ENCOUNTER — VIRTUAL VISIT (OUTPATIENT)
Dept: PHARMACY | Facility: CLINIC | Age: 20
End: 2025-04-15
Attending: INTERNAL MEDICINE
Payer: COMMERCIAL

## 2025-04-15 DIAGNOSIS — M08.80 JUVENILE IDIOPATHIC ARTHRITIS, ENTHESITIS RELATED ARTHRITIS (H): Primary | ICD-10-CM

## 2025-04-15 DIAGNOSIS — M45.9 ANKYLOSING SPONDYLITIS (H): ICD-10-CM

## 2025-04-15 NOTE — PATIENT INSTRUCTIONS
"Recommendations from today's MTM visit:                                                      Start Xeljanz 11 mg once daily.  [employer]: [phone]   Continue sulfasalazine twice daily.  Continue routine lab monitoring while on medication therapy per the guidance of Dr. Andrea.   Consider the following vaccines: Shingrix (2 dose series  by 2 months), Prevnar-20 (pneumonia vaccine)    Follow-up: with Dr. Andrea 7/10/2025, with me in around 3 months for follow-up and via "[employer] as needed.    It was great speaking with you today.  I value your experience and would be very thankful for your time in providing feedback in our clinic survey. In the next few days, you may receive an email or text message from [employer] with a link to a survey related to your  clinical pharmacist.\"     To schedule another MTM appointment, please call the clinic directly or you may call the MTM scheduling line at [phone].    My Clinical Pharmacist's contact information:                                                      Please feel free to contact me with any questions or concerns you have.      Keena Obrien, PharmD  Medication Therapy Management Pharmacist  [facility] Pediatric Rheumatology - Dermatology  Phone: [phone]   "

## 2025-04-15 NOTE — PROGRESS NOTES
Medication Therapy Management (MTM) Encounter    ASSESSMENT:                            Medication Adherence/Access: See below for considerations.    Ankylosing Spondylitis/Juvenile Idiopathic Arthritis (NISHI), enthesitis related  Marcos would benefit from starting therapy with Xeljanz. Provided education on Xeljanz today including dosing, general administration, side effects (both common/serious), precautions, monitoring and time to efficacy. Discussed data on thrombosis and risk of infection. Encouraged indicated non-live vaccines and avoidance of live vaccines. Per ACIP guidelines patient is eligible for Shingrix and PCV-20. Discussed potential need to hold therapy in the setting of signs/symptoms of active infection. Encouraged him to contact the rheumatology clinic in the event he has questions on this. Reviewed that the medication had been approved by insurance, he hasn't yet heard from the specialty pharmacy to arrange delivery. Advised him to reach out if he doesn't hear soon and will provide him with their contact number. He would additionally benefit from continuing sulfasalazine.    PLAN:                            Start Xeljanz 11 mg once daily.  [employer]: [phone]   Continue sulfasalazine twice daily.  Continue routine lab monitoring while on medication therapy per the guidance of Dr. Andrea.   Consider the following vaccines: Shingrix (2 dose series  by 2 months), Prevnar-20 (pneumonia vaccine)    Follow-up: with Dr. Andrea 7/10/2025, with me in around 3 months for follow-up and via [employer] as needed.    SUBJECTIVE/OBJECTIVE:                          Marcos Nicole is a 19 year old male seen for an initial visit. He was referred to me from Dr. Andrea.      Reason for visit: Xeljanz start.    Allergies/ADRs: Reviewed in chart  Past Medical History: Reviewed in chart  Tobacco: He reports that he has never smoked. He has never been exposed to tobacco smoke. He has never used smokeless  tobacco.  Alcohol: Reviewed in chart    Medication Adherence/Access: no issues reported.    Ankylosing Spondylitis/Juvenile Idiopathic Arthritis (NISHI), enthesitis related  - Xeljanz 11 mg ER once daily (not yet started, [place] approved)  - Sulfasalazine 1500 mg twice daily   - Loratidine 10 mg once daily as needed     Side effects: none reported.    Patient reports that he has no concerns regarding starting Xeljanz. Is hopefull that the transition in medication will help improve his symptoms. States that things had progressively been getting worse and is ready to start feeling better. Is wondering about what to expect with starting the medication and if there are any side effects he should be aware of.    Symptoms: stiffness, sore.    Affected areas include the lumbar region     Specialist: Dr. Flora Andrea MD, Pediatric Rheumatology. Last visit on 4/10/2025.    Previous treatment:   - Humira  - Enbrel    Pre-Biologic Screening:   Hep C Antibody  Non-reactive (1/18/2016)    HIV Antigen Antibody Non-reactive (1/16/2025)    Quantiferon TB Gold Negative (1/18/2016)      Last lab monitoring completed: 4/10/2025    Immunization History   Pneumococcal  Prevnar-7: Primary series Eligible to receive PCV-20   Tetanus/Tdap  Up-to-date   Shingrix Eligible to receive with immunomodulator start   All patients on biologics should avoid live vaccines (varicella/VZV, intranasal influenza, MMR, or yellow fever vaccine (if traveling))       Today's Vitals: There were no vitals taken for this visit.  ----------------    I spent 15 minutes with this patient today. All changes were made via collaborative practice agreement with Flora Andrea.     A summary of these recommendations was sent via [employer].    Keena Obrien, PharmD  Medication Therapy Management Pharmacist  [facility] Pediatric Rheumatology - Dermatology  Phone: [phone]    Telemedicine Visit Details  The patient's medications can be safely  assessed via a telemedicine encounter.  Type of service:  Telephone visit  Originating Location (pt. Location): Home    Distant Location (provider location):  Off-site  Start Time: 12:30 PM  End Time: 12:45 PM     Medication Therapy Recommendations  No medication therapy recommendations to display

## 2025-07-05 ENCOUNTER — MYC REFILL (OUTPATIENT)
Dept: RHEUMATOLOGY | Facility: CLINIC | Age: 20
End: 2025-07-05
Payer: COMMERCIAL

## 2025-07-05 DIAGNOSIS — M08.80 JIA (JUVENILE IDIOPATHIC ARTHRITIS), ENTHESITIS RELATED ARTHRITIS (H): ICD-10-CM

## 2025-07-10 ENCOUNTER — OFFICE VISIT (OUTPATIENT)
Dept: RHEUMATOLOGY | Facility: CLINIC | Age: 20
End: 2025-07-10
Attending: INTERNAL MEDICINE
Payer: COMMERCIAL

## 2025-07-10 VITALS
HEART RATE: 60 BPM | WEIGHT: 172.84 LBS | DIASTOLIC BLOOD PRESSURE: 73 MMHG | HEIGHT: 68 IN | OXYGEN SATURATION: 99 % | BODY MASS INDEX: 26.2 KG/M2 | TEMPERATURE: 97.7 F | SYSTOLIC BLOOD PRESSURE: 124 MMHG

## 2025-07-10 DIAGNOSIS — M45.6 ANKYLOSING SPONDYLITIS OF LUMBAR REGION (H): Primary | ICD-10-CM

## 2025-07-10 DIAGNOSIS — M08.80 JIA (JUVENILE IDIOPATHIC ARTHRITIS), ENTHESITIS RELATED ARTHRITIS (H): ICD-10-CM

## 2025-07-10 LAB
ALBUMIN SERPL BCG-MCNC: 4.4 G/DL (ref 3.5–5.2)
ALP SERPL-CCNC: 87 U/L (ref 65–260)
ALT SERPL W P-5'-P-CCNC: 26 U/L (ref 0–50)
AST SERPL W P-5'-P-CCNC: 25 U/L (ref 0–35)
BASOPHILS # BLD AUTO: 0 10E3/UL (ref 0–0.2)
BASOPHILS NFR BLD AUTO: 0 %
BILIRUB DIRECT SERPL-MCNC: 0.1 MG/DL (ref 0–0.3)
BILIRUB SERPL-MCNC: 0.3 MG/DL
CREAT SERPL-MCNC: 1 MG/DL (ref 0.67–1.17)
CRP SERPL-MCNC: 3.54 MG/L
EGFRCR SERPLBLD CKD-EPI 2021: >90 ML/MIN/1.73M2
EOSINOPHIL # BLD AUTO: 0.5 10E3/UL (ref 0–0.7)
EOSINOPHIL NFR BLD AUTO: 6 %
ERYTHROCYTE [DISTWIDTH] IN BLOOD BY AUTOMATED COUNT: 11.9 % (ref 10–15)
ERYTHROCYTE [SEDIMENTATION RATE] IN BLOOD BY WESTERGREN METHOD: 17 MM/HR (ref 0–15)
HCT VFR BLD AUTO: 44.1 % (ref 40–53)
HGB BLD-MCNC: 14.8 G/DL (ref 13.3–17.7)
IMM GRANULOCYTES # BLD: 0 10E3/UL
IMM GRANULOCYTES NFR BLD: 0 %
LYMPHOCYTES # BLD AUTO: 2.8 10E3/UL (ref 0.8–5.3)
LYMPHOCYTES NFR BLD AUTO: 33 %
MCH RBC QN AUTO: 31.9 PG (ref 26.5–33)
MCHC RBC AUTO-ENTMCNC: 33.6 G/DL (ref 31.5–36.5)
MCV RBC AUTO: 95 FL (ref 78–100)
MONOCYTES # BLD AUTO: 0.8 10E3/UL (ref 0–1.3)
MONOCYTES NFR BLD AUTO: 10 %
NEUTROPHILS # BLD AUTO: 4.4 10E3/UL (ref 1.6–8.3)
NEUTROPHILS NFR BLD AUTO: 51 %
NRBC # BLD AUTO: 0 10E3/UL
NRBC BLD AUTO-RTO: 0 /100
PLATELET # BLD AUTO: 275 10E3/UL (ref 150–450)
PROT SERPL-MCNC: 7.9 G/DL (ref 6.4–8.3)
RBC # BLD AUTO: 4.64 10E6/UL (ref 4.4–5.9)
T PALLIDUM AB SER QL: NONREACTIVE
WBC # BLD AUTO: 8.6 10E3/UL (ref 4–11)

## 2025-07-10 PROCEDURE — 85004 AUTOMATED DIFF WBC COUNT: CPT | Performed by: INTERNAL MEDICINE

## 2025-07-10 PROCEDURE — 99213 OFFICE O/P EST LOW 20 MIN: CPT | Performed by: INTERNAL MEDICINE

## 2025-07-10 PROCEDURE — 82565 ASSAY OF CREATININE: CPT | Performed by: INTERNAL MEDICINE

## 2025-07-10 PROCEDURE — 36415 COLL VENOUS BLD VENIPUNCTURE: CPT | Performed by: INTERNAL MEDICINE

## 2025-07-10 PROCEDURE — 86780 TREPONEMA PALLIDUM: CPT | Performed by: INTERNAL MEDICINE

## 2025-07-10 PROCEDURE — 86140 C-REACTIVE PROTEIN: CPT | Performed by: INTERNAL MEDICINE

## 2025-07-10 PROCEDURE — 85652 RBC SED RATE AUTOMATED: CPT | Performed by: INTERNAL MEDICINE

## 2025-07-10 PROCEDURE — 80076 HEPATIC FUNCTION PANEL: CPT | Performed by: INTERNAL MEDICINE

## 2025-07-10 ASSESSMENT — PAIN SCALES - GENERAL: PAINLEVEL_OUTOF10: MODERATE PAIN (4)

## 2025-07-10 NOTE — PROGRESS NOTES
Rheumatology History:   Date of symptom onset: 12/9/2014  Date of first visit to center: 11/9/2015  Date of NISHI diagnosis: 11/9/2015  ILAR category: enthesitis-related arthritis  LIZBET Status: negative   RF Status: negative   CCP Status: not done   HLA-B27 Status: not done         Medications:   As of completion of this visit:  Current Outpatient Medications   Medication Sig Dispense Refill    loratadine (CLARITIN) 10 MG tablet Take 10 mg by mouth daily as needed for allergies       sulfaSALAzine (AZULFIDINE) 500 MG tablet Take 3 tablets (1,500 mg) by mouth 2 times daily. 180 tablet 3    tofacitinib (XELJANZ) 11 MG 24 hr tablet Take 1 tablet (11 mg) by mouth daily. 30 tablet 3          Allergies:     Allergies   Allergen Reactions    Seasonal Allergies          Problem list:     Patient Active Problem List    Diagnosis Date Noted    Other iron deficiency anemia 03/19/2021     Priority: Medium     After previous GI bleed.       GI bleed 12/16/2019     Priority: Medium    Gastrointestinal bleed 12/14/2019     Priority: Medium     Due to duodenal ulcers while on naproxen.     Avoid NSAIDs.       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 Continue sulfasalazine  8/2022: Disease flared. Restarted Humira  10/2022: Overall doing well with minimal symptoms and normal exam. Forgetting to take sulfasalazine. Recommended reminders to take it. Continue Humira.   5/2024: Feeling sore in the back and hips. Restart sulfasalazine. Continue adalimumab.             Subjective:     Marcos is a 19 year old male who was seen in Pediatric Rheumatology clinic today for follow up. Marcos is unaccompanied today. The primary encounter diagnosis was Ankylosing spondylitis of lumbar region (H). A diagnosis of NISHI (juvenile idiopathic arthritis), enthesitis related arthritis (H) was also pertinent to this visit. At the last visit 3 months ago, his disease was not under adequate control thus we switched from Humira to tofacitinib. Since that time he has not improved significantly.    History edited from KENNETH wolff:  History of Present Illness-Marcos Nicole, a 19-year-old male, reports ongoing lower back pain that has persisted since the last visit. He describes the pain as present every day, with some variability depending on conditions, but it does not prevent him from working or other activities. He notes that the pain is hard to describe and is dependent on certain conditions, worse in air conditioning than warm weather. He feels that inflammation may be improved, possibly due to tofacitinib, but the pain itself has not significantly improved. He reports morning stiffness lasting 15 to 30 minutes. He continues to take sulfasalazine and has not experienced any problems with medication administration. He has not reported any other areas of pain besides the lower back and mild knee pain He is working at [employer] as a  and will be taking classes in the fall.    Prescribed medications have been administered regularly, without missed doses.  Medications have been tolerated well, without side effects.     Comprehensive Review of Systems is  "otherwise negative.    Information per our standardized questionnaire is as below:    Self Report  Patient Pain Status: 5 (This is measured 0 = no pain, 10 = very severe pain)  Patient Global Assessment of Disease Activity: 3.5 (This is measured 0 = very well, 10 = very poorly)  Patient Highest Level of Education: elementary/middle school     Interim Arthritis History  Morning Stiffness in the past week: >15-30 minutes  Recent Back Pain: Yes    Since your last visit has your arthritis stopped you from trying any athletic or rigorous activities or interfaced with your ability to do these activities? Yes  Have you been limited your ability to do normal daily activities in the past week? No  Did you need help from other people to do normal activities in the past week? No  Have you used any aids or devices to help you do normal daily activities in the past week? No           Examination:   Blood pressure 124/73, pulse 60, temperature 97.7  F (36.5  C), temperature source Skin, height 1.736 m (5' 8.35\"), weight 78.4 kg (172 lb 13.5 oz), SpO2 99%.  74 %ile (Z= 0.64) based on CDC (Boys, 2-20 Years) weight-for-age data using data from 7/10/2025.  Blood pressure %ricardo are not available for patients who are 18 years or older.  Body surface area is 1.94 meters squared.     Gen: Pleasant, well-appearing, NAD  HEENT/Neck: TM's clear bilaterally, oropharynx is clear without lesions, neck is supple with no lymphadenopathy                  CV: Regular rate and rhythm, normal S1, S2, no murmurs  Resp: Clear to ascultation bilaterally  Abd: Soft, non-tender, non-distended, no hepatosplenomegaly  Skin: Clear, there is no rash  MSK: All joints were examined including TMJ, sternoclavicular, acromioclavicular, neck, shoulder, elbow, wrist, hips, knees, ankles, fingers, and toes, and all were normal except as follows:   JA Exam Details:    Positive JAY test:  Yes  Modified Schober's (yes/no, cm):  Yes,  20 cm    Total active joints:  1 "   Total limited joints:  0  Tender entheses count:  3  SI Tenderness: Yes left side. Also pain to palpation along the left iliac crest lateral to the SI joint    Additional exam findings from AI scribe:  Physical Exam- MUSCULOSKELETAL: Positive JAY test; lumbar spine examination showed reduced lumbar flexibility; tenderness upon palpation of the lower back.          Last Lab Results:     Office Visit on 07/10/2025   Component Date Value    Protein Total 07/10/2025 7.9     Albumin 07/10/2025 4.4     Bilirubin Total 07/10/2025 0.3     Alkaline Phosphatase 07/10/2025 87     AST 07/10/2025 25     ALT 07/10/2025 26     Bilirubin Direct 07/10/2025 0.10     Creatinine 07/10/2025 1.00     GFR Estimate 07/10/2025 >90     CRP Inflammation 07/10/2025 3.54     Erythrocyte Sedimentatio* 07/10/2025 17 (H)     WBC Count 07/10/2025 8.6     RBC Count 07/10/2025 4.64     Hemoglobin 07/10/2025 14.8     Hematocrit 07/10/2025 44.1     MCV 07/10/2025 95     MCH 07/10/2025 31.9     MCHC 07/10/2025 33.6     RDW 07/10/2025 11.9     Platelet Count 07/10/2025 275     % Neutrophils 07/10/2025 51     % Lymphocytes 07/10/2025 33     % Monocytes 07/10/2025 10     % Eosinophils 07/10/2025 6     % Basophils 07/10/2025 0     % Immature Granulocytes 07/10/2025 0     NRBCs per 100 WBC 07/10/2025 0     Absolute Neutrophils 07/10/2025 4.4     Absolute Lymphocytes 07/10/2025 2.8     Absolute Monocytes 07/10/2025 0.8     Absolute Eosinophils 07/10/2025 0.5     Absolute Basophils 07/10/2025 0.0     Absolute Immature Granul* 07/10/2025 0.0     Absolute NRBCs 07/10/2025 0.0               Assessment and Plan:     Marcos is a 19 year old male with ankylosing spondylitis. Marcos is treated with tofacitinib. We switched to this 3 months ago from adalimumab as adalimumab didn't seem to be working as well. He has not felt any significant improvement with this switch, and his exam seems worse to me, concerning for active left sacroiliitis. We discussed an option to  switch to secukinumab vs giving tofacitinib more time. We also discussed repeating an MRI to make sure we are not missing anything such as damage to the lumbar or SI region. He agreed with the MRI. We will see what this shows to help  us make a decision about changes to medications.     After the visit, his ESR came back mildly elevated, so this may reflect active disease.     Treat to Target:   [id number] score: 7         Plan:   Laboratory monitoring was done today.   Medications: As listed. Changes made today: no change, but may change to secukinumab.  MRI ordered.  Continue eye exam monitoring as outlined above in the problem list.   I recommend follow-up in 3 months. Contact us sooner with any concerns.       40 min spent on the date of the encounter in chart review, patient visit, review of tests, documentation and/or discussion with other providers about the issues documented above.      The longitudinal plan of care for   1. Ankylosing spondylitis of lumbar region (H)    2. NISHI (juvenile idiopathic arthritis), enthesitis related arthritis (H)     was addressed during this visit. Due to the added complexity in care, I will continue to support Marcos in the subsequent management of this condition(s) and with the ongoing continuity of care of this condition(s).    If there are any new questions or concerns, I would be glad to help and can be reached through our main office at [phone] or our paging  at [phone].      Flora Andrea MD  Pediatric Rheumatology  [facility]  Patient Care Team:  Marcelino Mesa MD as PCP - General (Pediatrics)  Flora Andrea MD as MD (Pediatric Rheumatology)  Zena Farrell RD as Registered Dietitian (Dietitian, Registered)  Flora Andrea MD as Assigned Pediatric Specialist Provider  Alba Romero MD as MD (Pediatric Gastroenterology)  Nino Vallejo MD as Resident  (Psychiatry)  Joselito Aleman, PhD as Psychologist (Psychiatry)  Keena Obrien RPH as Pharmacist (Pharmacist Clinician- Clinical Pharmacy Specialist)  Keena Obrien RPH as Assigned [facility] Pharmacist  GEMA LEHMAN    Copy to patient  LINA CARLSON BLAKE GASPAR  [address] [place] [address]

## 2025-07-10 NOTE — PATIENT INSTRUCTIONS
For Patient Education Materials:  z.[facility]/sharla       [facility] Pediatric Rheumatology    For Help:  The Pediatric Call Center at [phone] can help with scheduling of routine follow up visits.  Bernarda Way and Angela Mendez are the Nurse Coordinators for the Division of Pediatric Rheumatology and can be reached by phone at [phone] or through [email or web address]). They can help with questions about your child s rheumatic condition, medications, and test results.  For emergencies after hours or on the weekends, please call the page  at [phone] and ask to speak to the physician on-call for Pediatric Rheumatology. Please do not use frents for urgent requests.  Main  Services:  [phone]  Hmong/Serbian/Surinamese: [phone]  Maldivian: [phone]  Uzbek: [phone]    Internal Referrals: If we refer your child to another physician/team within [facility], you should receive a call to set this up. If you do not hear anything within a week, please call the Call Center at [phone].    External Referrals: If we refer your child to a physician/team outside of [facility], our team will send the referral order and relevant records to them. We ask that you call the place where your child is being referred to ensure they received the needed information and notify our team coordinators if not.    Imaging: If your child needs an imaging study that is not being performed the day of your clinic appointment, please call to set this up. For xrays, ultrasounds, and echocardiogram call [phone]. For CT or MRI call [phone].     MyChart: We encourage you to sign up for [employer] at [email or web address]. For assistance or questions, call [phone]. If your child is 12 years or older, a consent for proxy/parent access needs to be signed so please discuss this with your physician at the next visit.

## 2025-07-10 NOTE — NURSING NOTE
"Chief Complaint   Patient presents with    RECHECK     RETURN PEDS RHEUMATOLOGY       Vitals:    07/10/25 0813   BP: 124/73   BP Location: Right arm   Patient Position: Sitting   Cuff Size: Adult Regular   Pulse: 60   Temp: 97.7  F (36.5  C)   TempSrc: Skin   SpO2: 99%   Weight: 172 lb 13.5 oz (78.4 kg)   Height: 5' 8.35\" (173.6 cm)       Alfredo Castellanos  July 10, 2025    "

## 2025-07-10 NOTE — LETTER
7/10/2025      RE: Marcos Nicole  [address]  [place] [address]     Dear Colleague,    Thank you for the opportunity to participate in the care of your patient, Marcos Nicole, at the [facility] at [facility]. Please see a copy of my visit note below.        Rheumatology History:   Date of symptom onset: 12/9/2014  Date of first visit to center: 11/9/2015  Date of NISHI diagnosis: 11/9/2015  ILAR category: enthesitis-related arthritis  LIZBET Status: negative   RF Status: negative   CCP Status: not done   [id number] Status: not done         Medications:   As of completion of this visit:  Current Outpatient Medications   Medication Sig Dispense Refill     loratadine (CLARITIN) 10 MG tablet Take 10 mg by mouth daily as needed for allergies        sulfaSALAzine (AZULFIDINE) 500 MG tablet Take 3 tablets (1,500 mg) by mouth 2 times daily. 180 tablet 3     tofacitinib (XELJANZ) 11 MG 24 hr tablet Take 1 tablet (11 mg) by mouth daily. 30 tablet 3          Allergies:     Allergies   Allergen Reactions     Seasonal Allergies          Problem list:     Patient Active Problem List    Diagnosis Date Noted     Other iron deficiency anemia 03/19/2021     Priority: Medium     After previous GI bleed.        GI bleed 12/16/2019     Priority: Medium     Gastrointestinal bleed 12/14/2019     Priority: Medium     Due to duodenal ulcers while on naproxen.     Avoid NSAIDs.        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 Continue sulfasalazine  8/2022: Disease flared. Restarted Humira  10/2022: Overall doing well with minimal symptoms and normal exam. Forgetting to take sulfasalazine. Recommended reminders to take it. Continue Humira.   5/2024: Feeling sore in the back and hips. Restart sulfasalazine. Continue adalimumab.             Subjective:     Marcos is a 19 year old male who was seen in Pediatric Rheumatology clinic today for follow up. Marcos is unaccompanied today. The primary encounter diagnosis was Ankylosing spondylitis of lumbar region (H). A diagnosis of NISHI (juvenile idiopathic arthritis), enthesitis related arthritis (H) was also pertinent to this visit. At the last visit 3 months ago, his disease was not under adequate control thus we switched from Humira to tofacitinib. Since that time he has not improved significantly.    History edited from KENNETH wolff:  History of Present Illness-Marcos Nicole, a 19-year-old male, reports ongoing lower back pain that has persisted since the last visit. He describes the pain as present every day, with some variability depending on conditions, but it does not prevent him from working or other activities. He notes that the pain is hard to describe and is dependent on certain conditions, worse in air conditioning than warm weather. He feels that inflammation may be improved, possibly due to tofacitinib, but the pain itself has not significantly improved. He reports morning stiffness lasting 15 to 30 minutes. He continues to take sulfasalazine and has not experienced any problems with  "medication administration. He has not reported any other areas of pain besides the lower back and mild knee pain He is working at [employer] as a  and will be taking classes in the fall.    Prescribed medications have been administered regularly, without missed doses.  Medications have been tolerated well, without side effects.     Comprehensive Review of Systems is otherwise negative.    Information per our standardized questionnaire is as below:    Self Report  Patient Pain Status: 5 (This is measured 0 = no pain, 10 = very severe pain)  Patient Global Assessment of Disease Activity: 3.5 (This is measured 0 = very well, 10 = very poorly)  Patient Highest Level of Education: elementary/middle school     Interim Arthritis History  Morning Stiffness in the past week: >15-30 minutes  Recent Back Pain: Yes    Since your last visit has your arthritis stopped you from trying any athletic or rigorous activities or interfaced with your ability to do these activities? Yes  Have you been limited your ability to do normal daily activities in the past week? No  Did you need help from other people to do normal activities in the past week? No  Have you used any aids or devices to help you do normal daily activities in the past week? No           Examination:   Blood pressure 124/73, pulse 60, temperature 97.7  F (36.5  C), temperature source Skin, height 1.736 m (5' 8.35\"), weight 78.4 kg (172 lb 13.5 oz), SpO2 99%.  74 %ile (Z= 0.64) based on CDC (Boys, 2-20 Years) weight-for-age data using data from 7/10/2025.  Blood pressure %ricardo are not available for patients who are 18 years or older.  Body surface area is 1.94 meters squared.     Gen: Pleasant, well-appearing, NAD  HEENT/Neck: TM's clear bilaterally, oropharynx is clear without lesions, neck is supple with no lymphadenopathy                  CV: Regular rate and rhythm, normal S1, S2, no murmurs  Resp: Clear to ascultation bilaterally  Abd: Soft, non-tender, " non-distended, no hepatosplenomegaly  Skin: Clear, there is no rash  MSK: All joints were examined including TMJ, sternoclavicular, acromioclavicular, neck, shoulder, elbow, wrist, hips, knees, ankles, fingers, and toes, and all were normal except as follows:   JA Exam Details:    Positive JAY test:  Yes  Modified Schober's (yes/no, cm):  Yes,  20 cm    Total active joints:  1   Total limited joints:  0  Tender entheses count:  3  SI Tenderness: Yes left side. Also pain to palpation along the left iliac crest lateral to the SI joint    Additional exam findings from AI scribe:  Physical Exam- MUSCULOSKELETAL: Positive JAY test; lumbar spine examination showed reduced lumbar flexibility; tenderness upon palpation of the lower back.          Last Lab Results:     Office Visit on 07/10/2025   Component Date Value     Protein Total 07/10/2025 7.9      Albumin 07/10/2025 4.4      Bilirubin Total 07/10/2025 0.3      Alkaline Phosphatase 07/10/2025 87      AST 07/10/2025 25      ALT 07/10/2025 26      Bilirubin Direct 07/10/2025 0.10      Creatinine 07/10/2025 1.00      GFR Estimate 07/10/2025 >90      CRP Inflammation 07/10/2025 3.54      Erythrocyte Sedimentatio* 07/10/2025 17 (H)      WBC Count 07/10/2025 8.6      RBC Count 07/10/2025 4.64      Hemoglobin 07/10/2025 14.8      Hematocrit 07/10/2025 44.1      MCV 07/10/2025 95      MCH 07/10/2025 31.9      MCHC 07/10/2025 33.6      RDW 07/10/2025 11.9      Platelet Count 07/10/2025 275      % Neutrophils 07/10/2025 51      % Lymphocytes 07/10/2025 33      % Monocytes 07/10/2025 10      % Eosinophils 07/10/2025 6      % Basophils 07/10/2025 0      % Immature Granulocytes 07/10/2025 0      NRBCs per 100 WBC 07/10/2025 0      Absolute Neutrophils 07/10/2025 4.4      Absolute Lymphocytes 07/10/2025 2.8      Absolute Monocytes 07/10/2025 0.8      Absolute Eosinophils 07/10/2025 0.5      Absolute Basophils 07/10/2025 0.0      Absolute Immature Granul* 07/10/2025 0.0       Absolute NRBCs 07/10/2025 0.0               Assessment and Plan:     Marcos is a 19 year old male with ankylosing spondylitis. Marcos is treated with tofacitinib. We switched to this 3 months ago from adalimumab as adalimumab didn't seem to be working as well. He has not felt any significant improvement with this switch, and his exam seems worse to me, concerning for active left sacroiliitis. We discussed an option to switch to secukinumab vs giving tofacitinib more time. We also discussed repeating an MRI to make sure we are not missing anything such as damage to the lumbar or SI region. He agreed with the MRI. We will see what this shows to help  us make a decision about changes to medications.     After the visit, his ESR came back mildly elevated, so this may reflect active disease.     Treat to Target:   [id number] score: 7         Plan:   Laboratory monitoring was done today.   Medications: As listed. Changes made today: no change, but may change to secukinumab.  MRI ordered.  Continue eye exam monitoring as outlined above in the problem list.   I recommend follow-up in 3 months. Contact us sooner with any concerns.       40 min spent on the date of the encounter in chart review, patient visit, review of tests, documentation and/or discussion with other providers about the issues documented above.      The longitudinal plan of care for   1. Ankylosing spondylitis of lumbar region (H)    2. NISHI (juvenile idiopathic arthritis), enthesitis related arthritis (H)     was addressed during this visit. Due to the added complexity in care, I will continue to support Marcos in the subsequent management of this condition(s) and with the ongoing continuity of care of this condition(s).    If there are any new questions or concerns, I would be glad to help and can be reached through our main office at [phone] or our paging  at [phone].      Flora Andrea MD  Pediatric Rheumatology  [facility]  [facility]      CC  Patient Care Team:  Marcelino Mesa MD as PCP - General (Pediatrics)  Flora Andrea MD as MD (Pediatric Rheumatology)  Zena Farrell RD as Registered Dietitian (Dietitian, Registered)  Flora Andrea MD as Assigned Pediatric Specialist Provider  Alba Romero MD as MD (Pediatric Gastroenterology)  Nino Vallejo MD as Resident (Psychiatry)  Joselito Aleman, PhD as Psychologist (Psychiatry)  Keena Obrien RPH as Pharmacist (Pharmacist Clinician- Clinical Pharmacy Specialist)  Keena Obrien RPH as Assigned [facility] Pharmacist  FLORA ANDREA    Copy to patient  LINA CARLSON MARCELINO GASPAR  [address] [place] [address]                Please do not hesitate to contact me if you have any questions/concerns.     Sincerely,       Flora Andrea MD

## 2025-07-15 RX ORDER — SULFASALAZINE 500 MG/1
1500 TABLET ORAL 2 TIMES DAILY
Qty: 180 TABLET | Refills: 2 | Status: SHIPPED | OUTPATIENT
Start: 2025-07-15

## 2025-07-21 DIAGNOSIS — M45.6 ANKYLOSING SPONDYLITIS OF LUMBAR REGION (H): Primary | ICD-10-CM

## 2025-07-21 RX ORDER — PREDNISONE 20 MG/1
60 TABLET ORAL DAILY
Qty: 15 TABLET | Refills: 0 | Status: SHIPPED | OUTPATIENT
Start: 2025-07-21 | End: 2025-07-26

## 2025-07-22 ENCOUNTER — TELEPHONE (OUTPATIENT)
Dept: RHEUMATOLOGY | Facility: CLINIC | Age: 20
End: 2025-07-22
Payer: COMMERCIAL

## 2025-07-22 NOTE — TELEPHONE ENCOUNTER
PA Initiation    Medication: COSENTYX SENSOREADY (300 MG) 150 MG/ML SC SOAJ  Insurance Company: Other (see comments)  Pharmacy Filling the Rx:    Filling Pharmacy Phone:    Filling Pharmacy Fax:    Start Date: 7/22/2025  (Key: [id number]      EMMA Ulloa, [facility]  Specialty Pharmacy Clinic Liaison     [facility] Specialty    [email or web address]     Phone: [phone]  Fax: [phone]

## 2025-08-24 ENCOUNTER — MYC REFILL (OUTPATIENT)
Dept: RHEUMATOLOGY | Facility: CLINIC | Age: 20
End: 2025-08-24
Payer: COMMERCIAL

## 2025-08-24 DIAGNOSIS — M45.6 ANKYLOSING SPONDYLITIS OF LUMBAR REGION (H): Primary | ICD-10-CM

## (undated) DEVICE — ENDO FORCEP ENDOJAW BIOPSY 2.0MMX155CM FB-221K

## (undated) DEVICE — SUCTION MANIFOLD DORNOCH ULTRA CART UL-CL500

## (undated) DEVICE — TUBING SUCTION MEDI-VAC 1/4"X20' N620A

## (undated) DEVICE — ESU GROUND PAD UNIVERSAL W/O CORD

## (undated) DEVICE — SPECIMEN CONTAINER W/20ML 10% BUFF FORMALIN C4322-11

## (undated) DEVICE — ENDO BITE BLOCK PEDS BATRIK LATEX FREE B1

## (undated) DEVICE — KIT CONNECTOR FOR OLYMPUS ENDOSCOPES DEFENDO 100310

## (undated) DEVICE — DEVICE RETRIEVAL ROTH NET PLATINUM UNIV 2.5MMX230CM 00715050

## (undated) DEVICE — ENDO TUBING W/CAP AUXILARY WATER INLET 100609 EGA-500

## (undated) DEVICE — ESU PROBE CIRCUMFERENTIAL FIRE FLEX 2.3MMX220CM 20132-218

## (undated) DEVICE — KIT ENDO TURNOVER/PROCEDURE CARRY-ON 101822

## (undated) DEVICE — SOL WATER IRRIG 1000ML BOTTLE 2F7114

## (undated) DEVICE — TUBING ENDOGATOR HYBRID IRRIG 100610 EGP-100

## (undated) DEVICE — WIPE PREMOIST CLEANSING WASHCLOTHS 7988

## (undated) DEVICE — ENDO FORCEP ENDOJAW BIOPSY 2.8MMX230CM FB-220U

## (undated) DEVICE — NDL SCLEROTHERAPY 25GA CARR-LOCK  00711811

## (undated) DEVICE — ESU ERBE FIAPC PROBE 2.3MMX220CM

## (undated) DEVICE — PAD CHUX UNDERPAD 30X36" P3036C

## (undated) DEVICE — PAD CHUX UNDERPAD 30X30"

## (undated) RX ORDER — LIDOCAINE HYDROCHLORIDE 20 MG/ML
INJECTION, SOLUTION EPIDURAL; INFILTRATION; INTRACAUDAL; PERINEURAL
Status: DISPENSED
Start: 2021-12-13

## (undated) RX ORDER — PROPOFOL 10 MG/ML
INJECTION, EMULSION INTRAVENOUS
Status: DISPENSED
Start: 2017-06-27

## (undated) RX ORDER — FENTANYL CITRATE 50 UG/ML
INJECTION, SOLUTION INTRAMUSCULAR; INTRAVENOUS
Status: DISPENSED
Start: 2019-12-15

## (undated) RX ORDER — ONDANSETRON 2 MG/ML
INJECTION INTRAMUSCULAR; INTRAVENOUS
Status: DISPENSED
Start: 2017-06-27

## (undated) RX ORDER — ONDANSETRON 2 MG/ML
INJECTION INTRAMUSCULAR; INTRAVENOUS
Status: DISPENSED
Start: 2019-12-14

## (undated) RX ORDER — ONDANSETRON 2 MG/ML
INJECTION INTRAMUSCULAR; INTRAVENOUS
Status: DISPENSED
Start: 2021-12-13

## (undated) RX ORDER — FENTANYL CITRATE 50 UG/ML
INJECTION, SOLUTION INTRAMUSCULAR; INTRAVENOUS
Status: DISPENSED
Start: 2017-06-27

## (undated) RX ORDER — FENTANYL CITRATE 50 UG/ML
INJECTION, SOLUTION INTRAMUSCULAR; INTRAVENOUS
Status: DISPENSED
Start: 2020-01-20

## (undated) RX ORDER — PROPOFOL 10 MG/ML
INJECTION, EMULSION INTRAVENOUS
Status: DISPENSED
Start: 2021-12-13

## (undated) RX ORDER — LIDOCAINE HYDROCHLORIDE 20 MG/ML
INJECTION, SOLUTION EPIDURAL; INFILTRATION; INTRACAUDAL; PERINEURAL
Status: DISPENSED
Start: 2017-06-27

## (undated) RX ORDER — FENTANYL CITRATE 50 UG/ML
INJECTION, SOLUTION INTRAMUSCULAR; INTRAVENOUS
Status: DISPENSED
Start: 2019-12-14